# Patient Record
Sex: FEMALE | Race: WHITE | Employment: OTHER | URBAN - METROPOLITAN AREA
[De-identification: names, ages, dates, MRNs, and addresses within clinical notes are randomized per-mention and may not be internally consistent; named-entity substitution may affect disease eponyms.]

---

## 2017-05-09 ENCOUNTER — HOSPITAL ENCOUNTER (EMERGENCY)
Age: 77
Discharge: HOME | End: 2017-05-09
Attending: EMERGENCY MEDICINE
Payer: MEDICARE

## 2017-05-09 ENCOUNTER — APPOINTMENT (OUTPATIENT)
Dept: CT IMAGING | Age: 77
End: 2017-05-09
Attending: EMERGENCY MEDICINE
Payer: MEDICARE

## 2017-05-09 VITALS
TEMPERATURE: 97.8 F | OXYGEN SATURATION: 98 % | DIASTOLIC BLOOD PRESSURE: 75 MMHG | HEIGHT: 63 IN | HEART RATE: 73 BPM | SYSTOLIC BLOOD PRESSURE: 157 MMHG | RESPIRATION RATE: 18 BRPM | WEIGHT: 113 LBS | BODY MASS INDEX: 20.02 KG/M2

## 2017-05-09 DIAGNOSIS — F03.90 DEMENTIA WITHOUT BEHAVIORAL DISTURBANCE, UNSPECIFIED DEMENTIA TYPE: ICD-10-CM

## 2017-05-09 DIAGNOSIS — R41.0 CONFUSION WITH NON-FOCAL NEURO EXAM: Primary | ICD-10-CM

## 2017-05-09 LAB
ALBUMIN SERPL BCP-MCNC: 4.3 G/DL (ref 3.4–5)
ALBUMIN/GLOB SERPL: 1.2 {RATIO} (ref 0.8–1.7)
ALP SERPL-CCNC: 53 U/L (ref 45–117)
ALT SERPL-CCNC: 25 U/L (ref 13–56)
ANION GAP BLD CALC-SCNC: 11 MMOL/L (ref 3–18)
APPEARANCE UR: CLEAR
AST SERPL W P-5'-P-CCNC: 22 U/L (ref 15–37)
BASOPHILS # BLD AUTO: 0 K/UL (ref 0–0.06)
BASOPHILS # BLD: 0 % (ref 0–2)
BILIRUB SERPL-MCNC: 0.4 MG/DL (ref 0.2–1)
BILIRUB UR QL: NEGATIVE
BUN SERPL-MCNC: 16 MG/DL (ref 7–18)
BUN/CREAT SERPL: 19 (ref 12–20)
CALCIUM SERPL-MCNC: 10 MG/DL (ref 8.5–10.1)
CHLORIDE SERPL-SCNC: 103 MMOL/L (ref 100–108)
CO2 SERPL-SCNC: 28 MMOL/L (ref 21–32)
COLOR UR: YELLOW
CREAT SERPL-MCNC: 0.85 MG/DL (ref 0.6–1.3)
DIFFERENTIAL METHOD BLD: ABNORMAL
EOSINOPHIL # BLD: 0.1 K/UL (ref 0–0.4)
EOSINOPHIL NFR BLD: 1 % (ref 0–5)
ERYTHROCYTE [DISTWIDTH] IN BLOOD BY AUTOMATED COUNT: 13.2 % (ref 11.6–14.5)
GLOBULIN SER CALC-MCNC: 3.7 G/DL (ref 2–4)
GLUCOSE SERPL-MCNC: 104 MG/DL (ref 74–99)
GLUCOSE UR STRIP.AUTO-MCNC: NEGATIVE MG/DL
HCT VFR BLD AUTO: 40.9 % (ref 35–45)
HGB BLD-MCNC: 13.8 G/DL (ref 12–16)
HGB UR QL STRIP: NEGATIVE
INR PPP: 0.8 (ref 0.8–1.2)
KETONES UR QL STRIP.AUTO: NEGATIVE MG/DL
LEUKOCYTE ESTERASE UR QL STRIP.AUTO: NEGATIVE
LYMPHOCYTES # BLD AUTO: 20 % (ref 21–52)
LYMPHOCYTES # BLD: 1.6 K/UL (ref 0.9–3.6)
MAGNESIUM SERPL-MCNC: 2.1 MG/DL (ref 1.6–2.6)
MCH RBC QN AUTO: 30.9 PG (ref 24–34)
MCHC RBC AUTO-ENTMCNC: 33.7 G/DL (ref 31–37)
MCV RBC AUTO: 91.5 FL (ref 74–97)
MONOCYTES # BLD: 0.4 K/UL (ref 0.05–1.2)
MONOCYTES NFR BLD AUTO: 5 % (ref 3–10)
NEUTS SEG # BLD: 6.2 K/UL (ref 1.8–8)
NEUTS SEG NFR BLD AUTO: 74 % (ref 40–73)
NITRITE UR QL STRIP.AUTO: NEGATIVE
PH UR STRIP: 6 [PH] (ref 5–8)
PLATELET # BLD AUTO: 203 K/UL (ref 135–420)
PMV BLD AUTO: 10.6 FL (ref 9.2–11.8)
POTASSIUM SERPL-SCNC: 4.1 MMOL/L (ref 3.5–5.5)
PROT SERPL-MCNC: 8 G/DL (ref 6.4–8.2)
PROT UR STRIP-MCNC: NEGATIVE MG/DL
PROTHROMBIN TIME: 11.3 SEC (ref 11.5–15.2)
RBC # BLD AUTO: 4.47 M/UL (ref 4.2–5.3)
SODIUM SERPL-SCNC: 142 MMOL/L (ref 136–145)
SP GR UR REFRACTOMETRY: 1.01 (ref 1–1.03)
UROBILINOGEN UR QL STRIP.AUTO: 0.2 EU/DL (ref 0.2–1)
WBC # BLD AUTO: 8.4 K/UL (ref 4.6–13.2)

## 2017-05-09 PROCEDURE — 80053 COMPREHEN METABOLIC PANEL: CPT | Performed by: EMERGENCY MEDICINE

## 2017-05-09 PROCEDURE — 99285 EMERGENCY DEPT VISIT HI MDM: CPT

## 2017-05-09 PROCEDURE — 85025 COMPLETE CBC W/AUTO DIFF WBC: CPT | Performed by: EMERGENCY MEDICINE

## 2017-05-09 PROCEDURE — 70450 CT HEAD/BRAIN W/O DYE: CPT

## 2017-05-09 PROCEDURE — 83735 ASSAY OF MAGNESIUM: CPT | Performed by: EMERGENCY MEDICINE

## 2017-05-09 PROCEDURE — 81003 URINALYSIS AUTO W/O SCOPE: CPT | Performed by: EMERGENCY MEDICINE

## 2017-05-09 PROCEDURE — 85610 PROTHROMBIN TIME: CPT | Performed by: EMERGENCY MEDICINE

## 2017-05-09 NOTE — ED NOTES
Pt ambulated to bathroom. Pt ambulates with slow, steady gait. Pt states, \"I need to wash my hands,\" and then attempts to leave bathroom. Sink pointed out and pt states, \"see I'm forgetting things. \" Pt calm and cooperative with this RN. Pt back in stretcher, with son at bedside. Call light within reach. No needs verbalized at this time.

## 2017-05-09 NOTE — DISCHARGE INSTRUCTIONS
Learning About the Symptoms of Dementia  What is dementia? We all forget things as we get older. Many older people have a slight loss of memory that does not affect their daily lives. But memory loss that gets worse may mean that you have dementia. Dementia is a loss of mental skills that affects your daily life. It can cause problems with memory, problem-solving, and learning. It also can cause problems with thinking and planning. Dementia usually gets worse over time. But how quickly it gets worse is different for each person. Some people stay the same for years. Others lose skills quickly. Your chances of having dementia rise as you get older. But this doesn't mean that everyone will get it. What causes dementia? Dementia is caused by damage to or changes in the brain. Alzheimer's disease is the most common kind of dementia. Alzheimer's causes a steady loss of memory and how well you can speak, think, and do your daily activities. The second most common kind of dementia is caused by a series of strokes. It's called vascular dementia. It often gets worse step by step. With each new stroke, more mental skills are lost.  Serious head injuries in the past can sometimes lead to dementia, too. What are the symptoms? Usually the first symptom of dementia is memory loss. Often the person who has the memory problem doesn't notice it, but family and friends do. People who have dementia may have increasing trouble with:  · Recalling recent events. They may forget appointments or lose objects. · Recognizing people and places. · Keeping up with conversations and activity. · Finding their way around familiar places, or driving to and from places they know well. · Keeping up personal care such as grooming or bathing. · Planning and carrying out routine tasks. They may have trouble following a recipe or writing a letter or email. What are some next steps?   If you are worried about memory loss, see your doctor soon. He or she can do a physical exam and ask questions about recent and past illnesses and life events. Think about bringing someone to your doctor's appointment to take notes for you. Your doctor may suggest a series of tests to measure memory changes over time. These tests give the doctor an overall picture of how well your brain is working. The doctor can use the results to decide the best treatment program and help make your life safer and easier. It is important to know that memory loss can be caused by things other than dementia. These things can include health problems such as depression, a low amount of thyroid hormone, and some infections. When those things are treated, your ability to remember can get better. Follow-up care is a key part of your treatment and safety. Be sure to make and go to all appointments, and call your doctor if you are having problems. It's also a good idea to know your test results and keep a list of the medicines you take. Where can you learn more? Go to http://rico-pinky.info/. Enter [id number] in the search box to learn more about \"Learning About the Symptoms of Dementia. \"  Current as of: December 30, 2016  Content Version: 11.2  © 5885-7521 [employer]. Care instructions adapted under license by [employer] (which disclaims liability or warranty for this information). If you have questions about a medical condition or this instruction, always ask your healthcare professional. [place] [address] any warranty or liability for your use of this information. Dementia: Care Instructions  Your Care Instructions  Dementia is a loss of mental skills that affects your daily life. It is different than the occasional trouble with memory that is part of aging. You may find it hard to remember things that you feel you should be able to remember.  Or you may feel that your mind is just not working as well as usual.  Finding out that you have dementia is a shock. You may be afraid and worried about how the condition will change your life. Although there is no cure at this time, medicine may slow memory loss and improve thinking for a while. Other medicines may be able to help you sleep or cope with depression and behavior changes. Dementia often gets worse slowly. But it can get worse quickly. As dementia gets worse, it may become harder to do common things that take planning, like making a list and going shopping. Over time, the disease may make it hard for you to take care of yourself. Some people with dementia need others to help care for them. Dementia is different for everyone. You may be able to function well for a long time. In the early stage of the condition, you can do things at home to make life easier and safer. You also can keep doing your hobbies and other activities. Many people find comfort in planning now for their future needs. Follow-up care is a key part of your treatment and safety. Be sure to make and go to all appointments, and call your doctor if you are having problems. Its also a good idea to know your test results and keep a list of the medicines you take. How can you care for yourself at home? · Take your medicines exactly as prescribed. Call your doctor if you think you are having a problem with your medicine. · Eat healthy foods. Eat lots of whole grains, fruits, and vegetables every day. If you are not hungry, try snacks or nutritional drinks such as Boost, Ensure, or Sustacal.  · If you have problems sleeping:  ¨ Try not to nap too close to your bedtime. ¨ Exercise regularly. Walking is a good choice. ¨ Try a glass of warm milk or caffeine-free herbal tea before bed. · Do tasks and activities during the time of day when you feel your best. It may help to develop a daily routine. · Post labels, lists, and sticky notes to help you remember things.  Write your activities on a calendar you can easily find. Put your clock where you can easily see it. · Stay active. Take walks in familiar places, or with friends or loved ones. Try to stay active mentally too. Read and work crossword puzzles if you enjoy these activities. · Do not drive unless you can pass an on-road driving test. If you are not sure if you are safe to drive, your state s license bureau can test you. · Keep a cordless phone and a flashlight with new batteries by your bed. If possible, put a phone in each of the main rooms of your house, or carry a cell phone in case you fall and cannot reach a phone. Or, you can wear a device around your neck or wrist. You push a button that sends a signal for help. Acknowledge your emotions and plan for the future  · Talk openly and honestly with your doctor. · Let yourself grieve. It is common to feel angry, scared, frustrated, anxious, or depressed. · Get emotional support from family, friends, a support group, or a counselor experienced in working with people who have dementia. · Ask for help if you need it. · Plan for the future. ¨ Talk to your family and doctor about preparing a living will and other important papers while you can make decisions. These papers tell your doctors how to care for you at the end of your life. ¨ Consider naming a person to make decisions about your care if you are not able to. When should you call for help? Call 911 anytime you think you may need emergency care. For example, call if:  · You are lost and do not know whom to call. · You are injured and do not know whom to call. Call your doctor now or seek immediate medical care if:  · You are more confused or upset than usual.  · You feel like you could hurt yourself because your mind is not working well. Watch closely for changes in your health, and be sure to contact your doctor if you have any problems. Where can you learn more?   Go to http://rico-pinky.info/. Enter X804 in the search box to learn more about \"Dementia: Care Instructions. \"  Current as of: July 26, 2016  Content Version: 11.2  © 5811-0871 [employer]. Care instructions adapted under license by [employer] (which disclaims liability or warranty for this information). If you have questions about a medical condition or this instruction, always ask your healthcare professional. [place] [address] any warranty or liability for your use of this information.

## 2017-05-09 NOTE — Clinical Note
SPECIAL DISCHARGE INSTRUCTIONS AND COMMENTS: 
 
General comments: Thank you for allowing us to provide you with excellent care today. We hope we addressed all of your concerns and needs. We strive to provide excellent quality care in the Emergency Depart ment. If you feel that you have not received excellent quality care or timely care, please ask to speak to the nurse manager. Please choose us in the future for your continued health care needs. Follow-up comments: The exam and treatment you rece ived in the Emergency Department were for an urgent problem that may be new for you and / or one which may be related to a worsening of a chronic or ongoing medical problem that you already had prior to this visit. In any case, today's treatment is not i ntended to be considered as complete care in all cases and thus, it is important that you follow-up with a doctor, nurse practitioner, or physicians assistant to: (1) confirm your diagnosis, (2) re-evaluation of changes in your illness and treatment, an d (3) for ongoing care. In some cases we may have contacted your doctor or a specific specialist who may be involved in your future evaluation and care but in any case, take this sheet with you when you go to your follow-up visit or refer to the infor darin on these sheets when you are calling to arrange an appointment - it may prove helpful in making the appointment. Prescribed Medications: Unless you have been directed by the provider to change your current medicines, you should continue to jesse e them as before. If you have been prescribed medicines, please take them as directed. In some cases, these new medicines are intended to replace a medicine that you are currently taking and if so, this will be noted below.  
 
 Our expectation is that y our condition will improve by following the doctor's recommendations, however, if in the event your condition worsens or does not improve as expected, please follow-up with your PCP or if unable to reach your usual health care provider, you should return  to the Emergency Department. We are available 24 hours a day.   
 
SPECIFIC INSTRUCTIONS PROVIDED BY YOUR DOCTOR, Mango Garcia MD:

## 2017-05-09 NOTE — ED NOTES
Pt hourly rounding competed. Safety   Pt (X) resting on stretcher with side rails up and call bell in reach. () in chair    () in parents arms. Toileting   Pt offered ()Bedpan     ()Assistance to Restroom     ()Urinal  Ongoing Updates  Updated on plan of care and status of test results. Pain Management  Inquired as to comfort and offered comfort measures:    (X) warm blankets   () dimmed lights  Son at bedside.

## 2017-05-09 NOTE — ED NOTES
Transported pt to CT via wheelchair;  Pt states Dossie Hattie is not happy being here, we have kept her and her son here all day long and he has better things to do than sit here all day because we are making him\"  I reoriented pt stating that her son is the person who brought her to the ED for evaluation today because she had been having periods of confusion; Pt continued to say not so pleasant things regarding the cramps in her legs because we made her lie on a stretcher;  I provided extra blankets but she stated \"this whole place is a refrigerator, I hate being here\"  Patient upset that she had to remove her earrings and glasses.   Given immediately back to patient after CT, pt aggressively took the bag out of my hand and again stated \" I hate being here, I just want to leave and go home\"  Pt refused to allow me to place the blood pressure cuff back on her arm;  Refused to get on the stretcher, sat in a chair in her treatment room;
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
## 2017-05-09 NOTE — ED NOTES
Patient appears frustrated at time of discharge. States, \"Do what you have to do to get me out of here. I want to go home. \" Discharge instructions reviewed with the patient and son with opportunity for questions given. The patient and son verbalized understanding. Patient armband removed and shredded. Patient in stable condition at time of discharge.
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
## 2017-05-09 NOTE — ED NOTES
Pt states \"I'm not happy. I've been here since 1230 and my leg has cramped up. How long till that CT scan? \" Attempt made to call CT for estimated time with no answer. Will attempt call later. Pt resting in stretcher on CCM with call light in reach. Offer made to ambulate pt and reposition to position of comfort. Pt declines offer. Son remains at bedside.